# Patient Record
Sex: FEMALE | Race: WHITE | NOT HISPANIC OR LATINO | URBAN - METROPOLITAN AREA
[De-identification: names, ages, dates, MRNs, and addresses within clinical notes are randomized per-mention and may not be internally consistent; named-entity substitution may affect disease eponyms.]

---

## 2017-01-24 ENCOUNTER — OUTPATIENT (OUTPATIENT)
Dept: OUTPATIENT SERVICES | Facility: HOSPITAL | Age: 52
LOS: 1 days | Discharge: HOME | End: 2017-01-24

## 2017-06-27 DIAGNOSIS — K59.09 OTHER CONSTIPATION: ICD-10-CM

## 2018-05-25 ENCOUNTER — EMERGENCY (EMERGENCY)
Facility: HOSPITAL | Age: 53
LOS: 0 days | Discharge: HOME | End: 2018-05-25
Attending: EMERGENCY MEDICINE | Admitting: EMERGENCY MEDICINE

## 2018-05-25 VITALS
SYSTOLIC BLOOD PRESSURE: 116 MMHG | TEMPERATURE: 98 F | HEART RATE: 66 BPM | RESPIRATION RATE: 18 BRPM | OXYGEN SATURATION: 100 % | DIASTOLIC BLOOD PRESSURE: 74 MMHG

## 2018-05-25 DIAGNOSIS — Z88.0 ALLERGY STATUS TO PENICILLIN: ICD-10-CM

## 2018-05-25 DIAGNOSIS — G43.909 MIGRAINE, UNSPECIFIED, NOT INTRACTABLE, WITHOUT STATUS MIGRAINOSUS: ICD-10-CM

## 2018-05-25 DIAGNOSIS — R51 HEADACHE: ICD-10-CM

## 2018-05-25 RX ORDER — SODIUM CHLORIDE 9 MG/ML
1000 INJECTION INTRAMUSCULAR; INTRAVENOUS; SUBCUTANEOUS
Qty: 0 | Refills: 0 | Status: DISCONTINUED | OUTPATIENT
Start: 2018-05-25 | End: 2018-05-25

## 2018-05-25 RX ORDER — ACETAMINOPHEN 500 MG
650 TABLET ORAL ONCE
Qty: 0 | Refills: 0 | Status: COMPLETED | OUTPATIENT
Start: 2018-05-25 | End: 2018-05-25

## 2018-05-25 RX ORDER — METOCLOPRAMIDE HCL 10 MG
10 TABLET ORAL ONCE
Qty: 0 | Refills: 0 | Status: COMPLETED | OUTPATIENT
Start: 2018-05-25 | End: 2018-05-25

## 2018-05-25 RX ORDER — KETOROLAC TROMETHAMINE 30 MG/ML
30 SYRINGE (ML) INJECTION ONCE
Qty: 0 | Refills: 0 | Status: DISCONTINUED | OUTPATIENT
Start: 2018-05-25 | End: 2018-05-25

## 2018-05-25 RX ADMIN — Medication 10 MILLIGRAM(S): at 08:02

## 2018-05-25 RX ADMIN — Medication 650 MILLIGRAM(S): at 08:01

## 2018-05-25 RX ADMIN — Medication 650 MILLIGRAM(S): at 09:19

## 2018-05-25 RX ADMIN — SODIUM CHLORIDE 1000 MILLILITER(S): 9 INJECTION INTRAMUSCULAR; INTRAVENOUS; SUBCUTANEOUS at 09:19

## 2018-05-25 RX ADMIN — SODIUM CHLORIDE 1000 MILLILITER(S): 9 INJECTION INTRAMUSCULAR; INTRAVENOUS; SUBCUTANEOUS at 08:49

## 2018-05-25 NOTE — ED PROVIDER NOTE - PHYSICAL EXAMINATION
Vital signs reviewed  GENERAL: Patient nontoxic, NAD  HEAD: NCAT  EYES: PERRL, EOMI  ENT: MMM  NECK: Supple, non tender, normal ROM  RESPIRATORY: Normal respiratory effort. CTA B/L. No wheezing, rales, rhonchi  CARDIOVASCULAR: Regular rate and rhythm. Normal S1/S2. No murmurs, rubs or gallops  ABDOMEN: Soft. Nondistended. Nontender. No guarding or rebound.  MUSCULOSKELETAL/EXTREMITIES: Brisk cap refill. 2+ radial pulses. No leg edema. 5/5 strength, moving all extremities.  SKIN:  Warm and dry.   NEURO: AAOx3. Speech clear and coherent. No facial droop. Equal sensation bilaterally. (-) Romberg. No gross FND.  PSYCHIATRIC: Cooperative. Affect appropriate.

## 2018-05-25 NOTE — ED PROVIDER NOTE - PROGRESS NOTE DETAILS
53 yo F hx migraines with periods p/w migraines after beginning periods, no focal defecits, not on medications. +nausea no vomiting or diarrhea. feels like her usual headaches but not responsive to naproxen last night. requesting oral medications only at this time. exam: well appearing no neuro deficits. a/p: 53 yo F menstrual headaches, no neuro deficits, received reglan and tylenol will reassess. Pt states symptoms improved, comfortable going home. Return precautions given. Neuro follow up given. Pt to f/u with PMD.

## 2018-05-25 NOTE — ED ADULT NURSE NOTE - CAS EDN DISCHARGE INTERVENTIONS
no discharge, no irritation, no pain, no redness, and no visual changes. IV discontinued, cath removed intact

## 2018-05-25 NOTE — ED PROVIDER NOTE - OBJECTIVE STATEMENT
53yo F with PMHx migraines p/w headache. Pt states she regularly gets migraine headaches that last 2-3 days when she starts her period. Pt started menstruating yesterday and headache started shortly after. Assoc with 2 episodes of vomiting this am, no nausea currently. Comes to ED today because headache was uncontrolled by meds at home. Denies numbness/weakness/tinging, vision changes, photophobia. Denies fever, CP, SOB, cough, diarrhea, abd pain, dysuria. States menstruation is normal, no heavy bleeding.

## 2018-05-25 NOTE — ED PROVIDER NOTE - NS ED ROS FT
Constitutional: No fever  Eyes:  No visual changes  ENMT:  No neck pain  Cardiac:  No chest pain  Respiratory:  No cough, SOB  GI:  +vomiting. No diarrhea, abdominal pain  :  No dysuria  MS:  No back pain  Neuro:  +headache. No lightheadedness  Skin:  No skin rash  Endocrine: No history of thyroid disease or diabetes  Except as documented in the HPI,  all other systems are negative.

## 2018-05-25 NOTE — ED PROVIDER NOTE - ATTENDING CONTRIBUTION TO CARE
51 yo F with PMHx migraines p/w headache. Pt states she regularly gets migraine headaches that last 2-3 days when she starts her period. Pt started menstruating yesterday and headache started shortly after. Denies fever, CP, SOB, cough, diarrhea, abd pain, dysuria. No loc, no trauma, no neck stiffness, feels dehydrated from the vomiting    CONSTITUTIONAL: Well-developed; well-nourished; in no acute distress. Sitting up and providing appropriate history and physical examination  SKIN: skin exam is warm and dry, no acute rash.  HEAD: Normocephalic; atraumatic.  EYES: PERRL, 3 mm bilateral, no nystagmus, EOM intact; conjunctiva and sclera clear.  ENT: + dry mucous membranes, No nasal discharge; airway clear.  NECK: Supple; non tender.+ full passive ROM in all directions. No JVD  CARD: S1, S2 normal; no murmurs, gallops, or rubs. Regular rate and rhythm. + Symmetric Strong Pulses  RESP: No wheezes, rales or rhonchi. Good air movement bilaterally  ABD: soft; non-distended; non-tender. No Rebound, No Gaurding, No signs of peritnitis, No CVA tenderness  EXT: Normal ROM. No clubbing, cyanosis or edema. Dp and Pt Pulses intact. Cap refill less than 3 seconds  NEURO: CN 2-12 intact, normal finger to nose, normal romberg, stable gait, no sensory or motor deficits, Alert, oriented, grossly unremarkable. No Focal deficits. GCS 15. NIH 0  PSYCH: Cooperative, appropriate.

## 2019-12-05 ENCOUNTER — OUTPATIENT (OUTPATIENT)
Dept: OUTPATIENT SERVICES | Facility: HOSPITAL | Age: 54
LOS: 1 days | Discharge: HOME | End: 2019-12-05

## 2019-12-05 DIAGNOSIS — R10.9 UNSPECIFIED ABDOMINAL PAIN: ICD-10-CM

## 2020-01-21 ENCOUNTER — APPOINTMENT (OUTPATIENT)
Dept: SURGERY | Facility: CLINIC | Age: 55
End: 2020-01-21
Payer: COMMERCIAL

## 2020-01-21 ENCOUNTER — OUTPATIENT (OUTPATIENT)
Dept: OUTPATIENT SERVICES | Facility: HOSPITAL | Age: 55
LOS: 1 days | Discharge: HOME | End: 2020-01-21
Payer: COMMERCIAL

## 2020-01-21 VITALS
WEIGHT: 113 LBS | TEMPERATURE: 98.3 F | DIASTOLIC BLOOD PRESSURE: 70 MMHG | HEART RATE: 80 BPM | HEIGHT: 60 IN | SYSTOLIC BLOOD PRESSURE: 95 MMHG | BODY MASS INDEX: 22.19 KG/M2

## 2020-01-21 DIAGNOSIS — Z78.9 OTHER SPECIFIED HEALTH STATUS: ICD-10-CM

## 2020-01-21 DIAGNOSIS — R92.8 OTHER ABNORMAL AND INCONCLUSIVE FINDINGS ON DIAGNOSTIC IMAGING OF BREAST: ICD-10-CM

## 2020-01-21 DIAGNOSIS — F17.200 NICOTINE DEPENDENCE, UNSPECIFIED, UNCOMPLICATED: ICD-10-CM

## 2020-01-21 PROBLEM — Z00.00 ENCOUNTER FOR PREVENTIVE HEALTH EXAMINATION: Status: ACTIVE | Noted: 2020-01-21

## 2020-01-21 PROCEDURE — 76642 ULTRASOUND BREAST LIMITED: CPT | Mod: 26,RT

## 2020-01-21 PROCEDURE — 19000 PUNCTURE ASPIR CYST BREAST: CPT

## 2020-01-21 PROCEDURE — G0279: CPT | Mod: 26

## 2020-01-21 PROCEDURE — 99202 OFFICE O/P NEW SF 15 MIN: CPT | Mod: 25

## 2020-01-21 PROCEDURE — 77066 DX MAMMO INCL CAD BI: CPT | Mod: 26

## 2020-01-22 PROBLEM — Z78.9 CAFFEINE USE: Status: ACTIVE | Noted: 2020-01-21

## 2020-01-22 PROBLEM — F17.200 CURRENT EVERY DAY SMOKER: Status: ACTIVE | Noted: 2020-01-21

## 2020-04-06 ENCOUNTER — APPOINTMENT (OUTPATIENT)
Dept: SURGERY | Facility: CLINIC | Age: 55
End: 2020-04-06

## 2020-06-01 ENCOUNTER — APPOINTMENT (OUTPATIENT)
Dept: SURGERY | Facility: CLINIC | Age: 55
End: 2020-06-01
Payer: COMMERCIAL

## 2020-06-01 VITALS
TEMPERATURE: 98.6 F | SYSTOLIC BLOOD PRESSURE: 104 MMHG | BODY MASS INDEX: 22.97 KG/M2 | DIASTOLIC BLOOD PRESSURE: 64 MMHG | WEIGHT: 117 LBS | HEIGHT: 60 IN | HEART RATE: 93 BPM

## 2020-06-01 PROCEDURE — 99212 OFFICE O/P EST SF 10 MIN: CPT | Mod: 25

## 2020-06-01 PROCEDURE — 19000 PUNCTURE ASPIR CYST BREAST: CPT

## 2020-06-01 NOTE — HISTORY OF PRESENT ILLNESS
[de-identified] : The patient returns for breast reexamination.  She notes that the right breast mass has reformed but it is not symptomatic. She now avoids chocolate and only drinks one cup of decaffeinated coffee per day.

## 2020-06-01 NOTE — PLAN
[FreeTextEntry1] : Return to office in 3 months for reevaluation or sooner as needed. She will reduce her caffeine intake as much as possible and appropriate precautions and warning signs were advised. All their questions were answered and they are happy with the assessment and plan

## 2020-06-01 NOTE — PHYSICAL EXAM
[de-identified] : no adenopathy [de-identified] : No nipple discharge, nipple retraction, suspicious skin changes noted bilaterally. There is a visible bulge in the right upper outer periareolar region, corresponding to a recurrence of the previously aspirated cyst, measuring 5 cm in size. No other masses and no suspicious areas are palpable in either breast, no axillary adenopathy bilaterally. after discussion with the patient and under aseptic technique, the cyst was reaspirated with a syringe and 20-gauge needle,  yielding 7 cc of whitish yellow fluid which was serous and nonbloody and the cyst collapsed completely and the patient tolerated this well.

## 2020-06-01 NOTE — DATA REVIEWED
[FreeTextEntry1] : Reviewed reports and images from bilateral mammogram and right breast sonogram done earlier today.

## 2020-06-01 NOTE — HISTORY OF PRESENT ILLNESS
[de-identified] : The patient presents with her male  for evaluation of a right breast cyst. She felt this about 2-1/2 weeks ago and there is some local discomfort but no actual pain. She has no prior history of any other breast disorders or breast surgery and she notes no other symptoms or changes in either breast. There is no history of local trauma or infection. A diagnostic bilateral mammogram and right breast sonogram were done earlier today, showing this to be a 5 cm simple cyst.\par Last GYN examination was 11 months ago, menarche was at age 13 and first pregnancy was at age 20. She is a  who continues to have regular periods, she never nursed will use hormones but does admit to some recent hot flashes.

## 2020-06-01 NOTE — ASSESSMENT
[FreeTextEntry1] : Benign breast cyst of the right upper outer quadrant. This may be related to hormonal changes associated with early menopause, and we discussed the options of aspiration versus observation, along with elimination of her caffeine intake as much as possible.\par \par The patient requested aspiration, which was done under aseptic technique with a syringe and 20-gauge needle. Five cc of clear yellow fluid was returned, which was nonbloody. The cyst collapsed completely with no residual palpable density. She tolerated the procedure well and full local care and activity instructions were given.

## 2020-06-01 NOTE — ASSESSMENT
[FreeTextEntry1] : Reaccumulation of a simple cyst in the upper outer right breast with no suspicious findings. If this continues to recur and excision can be considered but there is no need for surgery at this time. The patient will continue her caffeine restriction and return here in about 6 months for reexamination or sooner as needed. No further breast imaging is needed at this time.

## 2020-06-01 NOTE — PHYSICAL EXAM
[Normal Thyroid] : the thyroid was normal [Normal Breath Sounds] : Normal breath sounds [Normal Heart Sounds] : normal heart sounds [Normal Rate and Rhythm] : normal rate and rhythm [de-identified] : Moderate in size and symmetrical. No nipple discharge, nipple retraction, suspicious skin changes bilaterally. Visible bulge in the right upper outer breast is a 6 cm soft, cystic mass which is freely mobile and nontender. There are no overlying skin changes. No other masses and no areas of suspicion or palpable in either breast. No axillary adenopathy bilaterally.\par  [de-identified] : no adenopathy

## 2021-04-01 ENCOUNTER — APPOINTMENT (OUTPATIENT)
Dept: SURGERY | Facility: CLINIC | Age: 56
End: 2021-04-01
Payer: COMMERCIAL

## 2021-04-01 VITALS
HEART RATE: 84 BPM | DIASTOLIC BLOOD PRESSURE: 78 MMHG | BODY MASS INDEX: 23.56 KG/M2 | TEMPERATURE: 97 F | HEIGHT: 60 IN | SYSTOLIC BLOOD PRESSURE: 126 MMHG | WEIGHT: 120 LBS

## 2021-04-01 DIAGNOSIS — N63.10 UNSPECIFIED LUMP IN THE RIGHT BREAST, UNSPECIFIED QUADRANT: ICD-10-CM

## 2021-04-01 DIAGNOSIS — N60.19 DIFFUSE CYSTIC MASTOPATHY OF UNSPECIFIED BREAST: ICD-10-CM

## 2021-04-01 PROCEDURE — 99072 ADDL SUPL MATRL&STAF TM PHE: CPT

## 2021-04-01 PROCEDURE — 99212 OFFICE O/P EST SF 10 MIN: CPT

## 2021-04-01 NOTE — PHYSICAL EXAM
[de-identified] : no adenopathy [de-identified] : No nipple discharge, nipple retraction, suspicious skin changes noted bilaterally. No discrete masses, palpable areas of suspicion, focal tenderness in either breast. No palpable recurrence of the previous right breast cyst in the upper outer quadrant. No axillary adenopathy bilaterally

## 2021-04-01 NOTE — ASSESSMENT
[FreeTextEntry1] : Benign breast examination. No clinical evidence of recurrence of the previously aspirated cyst. The patient will return in 6 months for reevaluation. She is overdue for her annual mammogram and this should be done promptly. An appropriate requisition was provided. She and her  understand and agree, all their questions were answered.

## 2021-10-11 ENCOUNTER — APPOINTMENT (OUTPATIENT)
Dept: SURGERY | Facility: CLINIC | Age: 56
End: 2021-10-11